# Patient Record
Sex: FEMALE | Race: WHITE | NOT HISPANIC OR LATINO
[De-identification: names, ages, dates, MRNs, and addresses within clinical notes are randomized per-mention and may not be internally consistent; named-entity substitution may affect disease eponyms.]

---

## 2017-06-24 PROBLEM — Z00.00 ENCOUNTER FOR PREVENTIVE HEALTH EXAMINATION: Status: ACTIVE | Noted: 2017-06-24

## 2018-07-13 ENCOUNTER — APPOINTMENT (OUTPATIENT)
Dept: PEDIATRIC ADOLESCENT MEDICINE | Facility: CLINIC | Age: 23
End: 2018-07-13

## 2018-07-13 ENCOUNTER — RESULT CHARGE (OUTPATIENT)
Age: 23
End: 2018-07-13

## 2018-07-13 ENCOUNTER — OUTPATIENT (OUTPATIENT)
Dept: OUTPATIENT SERVICES | Facility: HOSPITAL | Age: 23
LOS: 1 days | Discharge: HOME | End: 2018-07-13

## 2018-07-13 VITALS
SYSTOLIC BLOOD PRESSURE: 102 MMHG | BODY MASS INDEX: 21.44 KG/M2 | HEART RATE: 88 BPM | HEIGHT: 66.5 IN | DIASTOLIC BLOOD PRESSURE: 62 MMHG | WEIGHT: 135 LBS | RESPIRATION RATE: 20 BRPM

## 2018-07-13 DIAGNOSIS — Z11.3 ENCOUNTER FOR SCREENING FOR INFECTIONS WITH A PREDOMINANTLY SEXUAL MODE OF TRANSMISSION: ICD-10-CM

## 2018-07-13 DIAGNOSIS — Z30.012 ENCOUNTER FOR PRESCRIPTION OF EMERGENCY CONTRACEPTION: ICD-10-CM

## 2018-07-13 DIAGNOSIS — Z32.02 ENCOUNTER FOR PREGNANCY TEST, RESULT NEGATIVE: ICD-10-CM

## 2018-07-13 DIAGNOSIS — Z30.011 ENCOUNTER FOR INITIAL PRESCRIPTION OF CONTRACEPTIVE PILLS: ICD-10-CM

## 2018-07-13 DIAGNOSIS — Z71.89 OTHER SPECIFIED COUNSELING: ICD-10-CM

## 2018-07-13 LAB — HCG UR QL: NEGATIVE

## 2018-07-13 RX ORDER — LEVONORGESTREL 1.5 MG/1
1.5 TABLET ORAL
Refills: 0 | Status: COMPLETED | OUTPATIENT
Start: 2018-07-13

## 2018-07-13 RX ORDER — NORGESTIMATE AND ETHINYL ESTRADIOL 7DAYSX3 LO
0.18/0.215/0.25 KIT ORAL DAILY
Refills: 0 | Status: COMPLETED | OUTPATIENT
Start: 2018-07-13

## 2018-07-13 RX ADMIN — LEVONORGESTREL 1 MG: 1.5 TABLET ORAL at 00:00

## 2018-07-13 RX ADMIN — Medication 1 MG-25 MCG: at 00:00

## 2018-07-13 NOTE — HISTORY OF PRESENT ILLNESS
[de-identified] : birth control [FreeTextEntry6] : requesting emergency contraception. considering hormonal contraception. had unprotected sex 1 day ago. feels well. no complaints. denies a personal h/o migraines, liver, heart and stroke disease. nkda, no medications

## 2018-07-13 NOTE — DISCUSSION/SUMMARY
[FreeTextEntry1] : method specific consent forms signed\par 6 packs dispensed\par f/u 6 m for refill and 3 weeks for results

## 2018-07-13 NOTE — RISK ASSESSMENT
[Has family members/adults to turn to for help] : has family members/adults to turn to for help [Eats regular meals including adequate fruits and vegetables] : eats regular meals including adequate fruits and vegetables [Has friends] : has friends [Drinks alcohol] : drinks alcohol [CRAELIT Score: ___] : [unfilled] [Home is free of violence] : home is free of violence [Has peer relationships free of violence] : has peer relationships free of violence [Has had sexual intercourse] : has had sexual intercourse [Has ways to cope with stress] : has ways to cope with stress [Displays self-confidence] : displays self-confidence [Uses tobacco] : does not use tobacco [Uses drugs] : does not use drugs

## 2018-07-16 DIAGNOSIS — Z71.3 DIETARY COUNSELING AND SURVEILLANCE: ICD-10-CM

## 2018-07-16 DIAGNOSIS — Z30.012 ENCOUNTER FOR PRESCRIPTION OF EMERGENCY CONTRACEPTION: ICD-10-CM

## 2018-07-16 DIAGNOSIS — Z71.89 OTHER SPECIFIED COUNSELING: ICD-10-CM

## 2018-07-16 DIAGNOSIS — Z32.02 ENCOUNTER FOR PREGNANCY TEST, RESULT NEGATIVE: ICD-10-CM

## 2018-07-16 DIAGNOSIS — Z30.011 ENCOUNTER FOR INITIAL PRESCRIPTION OF CONTRACEPTIVE PILLS: ICD-10-CM

## 2018-07-20 LAB
C TRACH RRNA SPEC QL NAA+PROBE: NOT DETECTED
N GONORRHOEA RRNA SPEC QL NAA+PROBE: NOT DETECTED
SOURCE AMPLIFICATION: NORMAL

## 2023-12-31 PROBLEM — Z11.3 ENCOUNTER FOR SCREENING EXAMINATION FOR SEXUALLY TRANSMITTED DISEASE: Status: RESOLVED | Noted: 2018-07-13 | Resolved: 2020-12-16
